# Patient Record
Sex: FEMALE | Race: WHITE | NOT HISPANIC OR LATINO | Employment: FULL TIME | URBAN - METROPOLITAN AREA
[De-identification: names, ages, dates, MRNs, and addresses within clinical notes are randomized per-mention and may not be internally consistent; named-entity substitution may affect disease eponyms.]

---

## 2020-02-10 ENCOUNTER — TELEPHONE (OUTPATIENT)
Dept: OTOLARYNGOLOGY | Facility: CLINIC | Age: 55
End: 2020-02-10

## 2020-02-10 ENCOUNTER — OFFICE VISIT (OUTPATIENT)
Dept: OTOLARYNGOLOGY | Facility: CLINIC | Age: 55
End: 2020-02-10
Payer: COMMERCIAL

## 2020-02-10 VITALS
BODY MASS INDEX: 19.58 KG/M2 | DIASTOLIC BLOOD PRESSURE: 68 MMHG | HEIGHT: 65 IN | SYSTOLIC BLOOD PRESSURE: 111 MMHG | HEART RATE: 67 BPM | WEIGHT: 117.5 LBS | TEMPERATURE: 98 F

## 2020-02-10 DIAGNOSIS — R51.9 NONINTRACTABLE EPISODIC HEADACHE, UNSPECIFIED HEADACHE TYPE: ICD-10-CM

## 2020-02-10 DIAGNOSIS — J01.90 ACUTE NON-RECURRENT SINUSITIS, UNSPECIFIED LOCATION: ICD-10-CM

## 2020-02-10 DIAGNOSIS — J30.9 ALLERGIC RHINITIS, UNSPECIFIED SEASONALITY, UNSPECIFIED TRIGGER: ICD-10-CM

## 2020-02-10 DIAGNOSIS — J34.2 NASAL SEPTAL DEVIATION: ICD-10-CM

## 2020-02-10 DIAGNOSIS — J10.1 INFLUENZA A: Primary | ICD-10-CM

## 2020-02-10 DIAGNOSIS — R05.9 COUGH: ICD-10-CM

## 2020-02-10 DIAGNOSIS — H61.20 IMPACTED CERUMEN, UNSPECIFIED LATERALITY: ICD-10-CM

## 2020-02-10 PROCEDURE — 69210 REMOVE IMPACTED EAR WAX UNI: CPT | Mod: S$GLB,,, | Performed by: SPECIALIST

## 2020-02-10 PROCEDURE — 3008F PR BODY MASS INDEX (BMI) DOCUMENTED: ICD-10-PCS | Mod: CPTII,S$GLB,, | Performed by: SPECIALIST

## 2020-02-10 PROCEDURE — 99214 PR OFFICE/OUTPT VISIT, EST, LEVL IV, 30-39 MIN: ICD-10-PCS | Mod: 25,S$GLB,, | Performed by: SPECIALIST

## 2020-02-10 PROCEDURE — 96372 THER/PROPH/DIAG INJ SC/IM: CPT | Mod: 59,S$GLB,, | Performed by: SPECIALIST

## 2020-02-10 PROCEDURE — 96372 PR INJECTION,THERAP/PROPH/DIAG2ST, IM OR SUBCUT: ICD-10-PCS | Mod: 59,S$GLB,, | Performed by: SPECIALIST

## 2020-02-10 PROCEDURE — 3008F BODY MASS INDEX DOCD: CPT | Mod: CPTII,S$GLB,, | Performed by: SPECIALIST

## 2020-02-10 PROCEDURE — 99214 OFFICE O/P EST MOD 30 MIN: CPT | Mod: 25,S$GLB,, | Performed by: SPECIALIST

## 2020-02-10 PROCEDURE — 69210 EAR CERUMEN REMOVAL: ICD-10-PCS | Mod: S$GLB,,, | Performed by: SPECIALIST

## 2020-02-10 RX ORDER — BETAMETHASONE SODIUM PHOSPHATE AND BETAMETHASONE ACETATE 3; 3 MG/ML; MG/ML
6 INJECTION, SUSPENSION INTRA-ARTICULAR; INTRALESIONAL; INTRAMUSCULAR; SOFT TISSUE ONCE
Status: DISCONTINUED | OUTPATIENT
Start: 2020-02-10 | End: 2020-02-12 | Stop reason: SDUPTHER

## 2020-02-10 RX ORDER — CEFTRIAXONE 1 G/1
1 INJECTION, POWDER, FOR SOLUTION INTRAMUSCULAR; INTRAVENOUS
Status: DISCONTINUED | OUTPATIENT
Start: 2020-02-10 | End: 2020-02-12 | Stop reason: SDUPTHER

## 2020-02-10 RX ORDER — LEVOTHYROXINE SODIUM 125 UG/1
TABLET ORAL
COMMUNITY
Start: 2020-01-17

## 2020-02-10 RX ORDER — LIDOCAINE HYDROCHLORIDE 10 MG/ML
2 INJECTION INFILTRATION; PERINEURAL ONCE
Status: COMPLETED | OUTPATIENT
Start: 2020-02-10 | End: 2020-02-10

## 2020-02-10 RX ORDER — LIDOCAINE HYDROCHLORIDE 10 MG/ML
2 INJECTION INFILTRATION; PERINEURAL ONCE
Status: SHIPPED | OUTPATIENT
Start: 2020-02-10

## 2020-02-10 RX ORDER — CEFTRIAXONE 1 G/1
1 INJECTION, POWDER, FOR SOLUTION INTRAMUSCULAR; INTRAVENOUS
Status: COMPLETED | OUTPATIENT
Start: 2020-02-10 | End: 2020-02-10

## 2020-02-10 RX ORDER — BETAMETHASONE SODIUM PHOSPHATE AND BETAMETHASONE ACETATE 3; 3 MG/ML; MG/ML
6 INJECTION, SUSPENSION INTRA-ARTICULAR; INTRALESIONAL; INTRAMUSCULAR; SOFT TISSUE ONCE
Status: COMPLETED | OUTPATIENT
Start: 2020-02-10 | End: 2020-02-10

## 2020-02-10 RX ORDER — OSELTAMIVIR PHOSPHATE 75 MG/1
75 CAPSULE ORAL 2 TIMES DAILY
Qty: 10 CAPSULE | Refills: 0 | Status: SHIPPED | OUTPATIENT
Start: 2020-02-10 | End: 2020-02-15

## 2020-02-10 RX ORDER — AZITHROMYCIN 500 MG/1
500 TABLET, FILM COATED ORAL DAILY
Qty: 3 TABLET | Refills: 1 | Status: SHIPPED | OUTPATIENT
Start: 2020-02-10 | End: 2020-02-13

## 2020-02-10 RX ADMIN — LIDOCAINE HYDROCHLORIDE 2 ML: 10 INJECTION INFILTRATION; PERINEURAL at 02:02

## 2020-02-10 RX ADMIN — CEFTRIAXONE 1 G: 1 INJECTION, POWDER, FOR SOLUTION INTRAMUSCULAR; INTRAVENOUS at 02:02

## 2020-02-10 RX ADMIN — BETAMETHASONE SODIUM PHOSPHATE AND BETAMETHASONE ACETATE 6 MG: 3; 3 INJECTION, SUSPENSION INTRA-ARTICULAR; INTRALESIONAL; INTRAMUSCULAR; SOFT TISSUE at 02:02

## 2020-02-10 NOTE — TELEPHONE ENCOUNTER
----- Message from Kyra Azevedo sent at 2/10/2020  8:27 AM CST -----  Contact: JANAE CM [[id number]]  Name of Who is Calling : JANAE CM [[id number]]    Patient is requesting a call from staff in regards to getting an appointment for today due to upper respiratory infection  .....Please contact to further discuss and advise.    Can the clinic reply by [facility] : No    What Number to Call Back :  [phone]

## 2020-02-10 NOTE — PROCEDURES
"Shannan Puentes is a 54 y.o. female patient.    Temp: 97.7 °F (36.5 °C) (02/10/20 1309)  Pulse: 67 (02/10/20 1309)  BP: 111/68 (02/10/20 1309)  Weight: 53.3 kg (117 lb 8 oz) (02/10/20 1309)  Height: 5' 5" (165.1 cm) (02/10/20 1309)       Ear Cerumen Removal  Date/Time: 2/10/2020 1:00 PM  Performed by: ARSEN Morgan MD  Authorized by: ARSEN Morgan MD     Time out: Immediately prior to procedure a "time out" was called to verify the correct patient, procedure, equipment, support staff and site/side marked as required.    Consent Done?:  Yes (Verbal)    Local anesthetic:  None  Location details:  Both ears  Procedure type comment:  [place] suction and irrigation  Cerumen  Removal Results:  Cerumen completely removed        LEWIS Morgan  2/10/2020    "

## 2020-02-10 NOTE — TELEPHONE ENCOUNTER
----- Message from Shannan Mei sent at 2/10/2020 10:32 AM CST -----  Contact: SHANNAN CM [[id number]]  Type:  Patient Returning Call    Who Called: SHANNAN CM [[id number]]    Who Left Message for Patient: Lakhwinder VAZQUEZ    Does the patient know what this is regarding?: Appointment    Can the clinic reply in [facility]: No    Best Call Back Number: [phone]    Additional Information: N/A

## 2020-02-10 NOTE — TELEPHONE ENCOUNTER
----- Message from Kyra Azevedo sent at 2/10/2020 11:04 AM CST -----  Contact: JANAE CM [[id number]]  Name of Who is Calling : JANAE CM [[id number]]    Patient is returning a call from [facility] in regards to being seen today   .....Please contact to further discuss and advise.    Can the clinic reply by [facility] : No    What Number to Call Back :  [phone]

## 2020-02-10 NOTE — PROGRESS NOTES
Subjective:       Patient ID: Shannan Puentes is a 54 y.o. female.    Chief Complaint: Cough (with chest pain) and Sinus Problem (with congestion)    The patient is an established patient from a former practice.  Three days ago she developed sudden onset of headache, congestion, runny nose, sore throat and coughing.  She has had a low-grade fever.  She also has some muscle and joint aches.  Her  was seen in an urgent care for a similar illness 2 days prior to her onset of symptoms.  By flu test and strep test done at the urgent care on him were negative.    Review of Systems   Constitutional: Positive for chills, fatigue and fever. Negative for activity change and appetite change.   HENT: Positive for congestion, ear pain, postnasal drip, rhinorrhea, sinus pressure, sinus pain and sore throat. Negative for ear discharge, facial swelling, hearing loss, mouth sores, sneezing, tinnitus, trouble swallowing and voice change.    Eyes: Negative for photophobia, pain, discharge, redness, itching and visual disturbance.   Respiratory: Positive for cough. Negative for apnea, choking, shortness of breath and wheezing.    Cardiovascular: Negative for chest pain and palpitations.   Gastrointestinal: Negative for abdominal distention, abdominal pain, nausea and vomiting.   Musculoskeletal: Negative for arthralgias, myalgias, neck pain and neck stiffness.   Skin: Negative.  Negative for color change, pallor and rash.   Allergic/Immunologic: Positive for environmental allergies. Negative for food allergies and immunocompromised state.   Neurological: Positive for dizziness, weakness, light-headedness and headaches. Negative for speech difficulty.   Hematological: Negative for adenopathy. Does not bruise/bleed easily.   Psychiatric/Behavioral: Negative for agitation, confusion, decreased concentration and sleep disturbance.       Objective:      Physical Exam   Constitutional: She is oriented to person, place, and time.  She appears well-developed and well-nourished.   HENT:   Head: Normocephalic.   Right Ear: External ear and ear canal normal. Tympanic membrane is retracted. Tympanic membrane mobility is abnormal.   Left Ear: External ear and ear canal normal. Tympanic membrane is retracted. Tympanic membrane mobility is abnormal.   Nose: Mucosal edema (with inflamed turbinates bilaterall), rhinorrhea (Light yellow pus bilaterally ) and septal deviation (To the right) present. No nasal deformity.   Mouth/Throat: Uvula is midline and mucous membranes are normal. No oral lesions. Posterior oropharyngeal erythema ( mild) present. No oropharyngeal exudate (erythema thin yellow pus from the nasopharynx). No tonsillar exudate.   Eyes: Pupils are equal, round, and reactive to light. EOM and lids are normal. Right eye exhibits no discharge. Left eye exhibits no discharge. Right conjunctiva is injected. Left conjunctiva is injected.   Neck: Trachea normal, normal range of motion, full passive range of motion without pain and phonation normal. Neck supple. No neck rigidity. No thyroid mass and no thyromegaly present.   Cardiovascular: Normal rate, regular rhythm and normal heart sounds.   Pulmonary/Chest: No respiratory distress. She has decreased breath sounds ( Diffusely). She has no wheezes. She has no rhonchi. She has no rales.   Abdominal: Soft. Bowel sounds are normal. There is no tenderness.   Musculoskeletal: Normal range of motion.        Right shoulder: Normal.   Lymphadenopathy:        Head (right side): No occipital adenopathy present.        Head (left side): No occipital adenopathy present.     She has no cervical adenopathy.   Neurological: She is alert and oriented to person, place, and time. She has normal strength. No cranial nerve deficit or sensory deficit. Gait normal.   Skin: Skin is warm and dry. No lesion, no petechiae and no rash noted. No cyanosis. Nails show no clubbing.   Psychiatric: She has a normal mood and  affect. Her speech is normal and behavior is normal. Cognition and memory are normal.       Influenza A screen:  Positive    Influenza B screen:  Negative     Assessment:       1. Influenza A    2. Nonintractable episodic headache, unspecified headache type    3. Cough    4. Nasal septal deviation    5. Impacted cerumen, unspecified laterality    6. Acute non-recurrent sinusitis, unspecified location    7. Allergic rhinitis, unspecified seasonality, unspecified trigger        Plan:       I will place the patient on Tamiflu am giving her Zithromax to start in the event she develops purulent secretions from the nose or in the chest.  I will recheck her in 1 week on an as-needed basis.  If her condition is worsening she will call.  If she develops significant respiratory distress she needs to go to an emergency room.

## 2020-09-29 ENCOUNTER — OFFICE VISIT (OUTPATIENT)
Dept: OTOLARYNGOLOGY | Facility: CLINIC | Age: 55
End: 2020-09-29
Payer: COMMERCIAL

## 2020-09-29 VITALS
DIASTOLIC BLOOD PRESSURE: 73 MMHG | SYSTOLIC BLOOD PRESSURE: 119 MMHG | HEART RATE: 71 BPM | WEIGHT: 119.19 LBS | BODY MASS INDEX: 20.35 KG/M2 | TEMPERATURE: 98 F | HEIGHT: 64 IN

## 2020-09-29 DIAGNOSIS — H61.22 IMPACTED CERUMEN OF LEFT EAR: ICD-10-CM

## 2020-09-29 DIAGNOSIS — H93.291 ABNORMAL AUDITORY PERCEPTION OF RIGHT EAR: Primary | ICD-10-CM

## 2020-09-29 DIAGNOSIS — J34.2 NASAL SEPTAL DEVIATION: ICD-10-CM

## 2020-09-29 DIAGNOSIS — J30.9 ALLERGIC RHINITIS, UNSPECIFIED SEASONALITY, UNSPECIFIED TRIGGER: ICD-10-CM

## 2020-09-29 PROCEDURE — 69210 PR REMOVAL IMPACTED CERUMEN REQUIRING INSTRUMENTATION, UNILATERAL: ICD-10-PCS | Mod: S$GLB,,, | Performed by: SPECIALIST

## 2020-09-29 PROCEDURE — 69210 REMOVE IMPACTED EAR WAX UNI: CPT | Mod: S$GLB,,, | Performed by: SPECIALIST

## 2020-09-29 PROCEDURE — 3008F PR BODY MASS INDEX (BMI) DOCUMENTED: ICD-10-PCS | Mod: CPTII,S$GLB,, | Performed by: SPECIALIST

## 2020-09-29 PROCEDURE — 99213 OFFICE O/P EST LOW 20 MIN: CPT | Mod: 25,S$GLB,, | Performed by: SPECIALIST

## 2020-09-29 PROCEDURE — 99213 PR OFFICE/OUTPT VISIT, EST, LEVL III, 20-29 MIN: ICD-10-PCS | Mod: 25,S$GLB,, | Performed by: SPECIALIST

## 2020-09-29 PROCEDURE — 3008F BODY MASS INDEX DOCD: CPT | Mod: CPTII,S$GLB,, | Performed by: SPECIALIST

## 2020-09-29 RX ORDER — AZELASTINE 1 MG/ML
SPRAY, METERED NASAL
Qty: 30 ML | Refills: 11 | Status: SHIPPED | OUTPATIENT
Start: 2020-09-29

## 2020-09-29 RX ORDER — FLUTICASONE PROPIONATE 50 MCG
SPRAY, SUSPENSION (ML) NASAL
Qty: 18.2 ML | Refills: 11 | Status: SHIPPED | OUTPATIENT
Start: 2020-09-29

## 2020-09-29 NOTE — PROGRESS NOTES
Subjective:       Patient ID: Shannan Puentes is a 54 y.o. female.    Chief Complaint: Follow-up (with Right Ear fullness)    Follow-up    The patient is experiencing S fullness and decreased hearing in her right ear for the last 2 weeks.  She feels like the ear needs to pop but it will not.  She has no vertigo and no tinnitus.  She is having some congestion and postnasal drip.  She typically uses Aleve for sinus and Sudafed.  She feels that the hearing in her right ear is slightly diminished.  Nasal secretions are clear.  She does not have fever.      Review of Systems   Constitutional: Negative for activity change and appetite change.   HENT: Positive for ear pain, postnasal drip, rhinorrhea, sinus pressure and sinus pain. Negative for ear discharge, facial swelling, hearing loss, mouth sores, sneezing, tinnitus, trouble swallowing and voice change.    Eyes: Negative for photophobia, pain, discharge, redness, itching and visual disturbance.   Respiratory: Negative for apnea, choking, shortness of breath and wheezing.    Cardiovascular: Negative for palpitations.   Gastrointestinal: Negative for abdominal distention.   Musculoskeletal: Negative for neck stiffness.   Skin: Negative.  Negative for color change and pallor.   Allergic/Immunologic: Positive for environmental allergies. Negative for food allergies and immunocompromised state.   Neurological: Positive for dizziness and light-headedness. Negative for speech difficulty.   Hematological: Negative for adenopathy. Does not bruise/bleed easily.   Psychiatric/Behavioral: Negative for agitation, confusion, decreased concentration and sleep disturbance.       Objective:      Physical Exam  Constitutional:       Appearance: She is well-developed.   HENT:      Head: Normocephalic.      Jaw: There is normal jaw occlusion.      Salivary Glands: Right salivary gland is not diffusely enlarged. Left salivary gland is not diffusely enlarged.      Right Ear: Ear canal  and external ear normal. Decreased hearing noted. There is no impacted cerumen. Tympanic membrane is retracted. Tympanic membrane has decreased mobility.      Left Ear: Hearing, ear canal and external ear normal. There is impacted cerumen. Tympanic membrane is retracted. Tympanic membrane has decreased mobility.      Ears:      Santoro exam findings: lateralizes right.     Right Rinne: AC > BC.     Left Rinne: AC > BC.     Nose: Septal deviation (To the right), mucosal edema (with inflamed turbinates bilaterall) and rhinorrhea (Light yellow pus bilaterally ) present. No nasal deformity.      Right Turbinates: Enlarged and pale.      Left Turbinates: Enlarged and pale.      Mouth/Throat:      Lips: No lesions.      Mouth: No oral lesions.      Tongue: No lesions.      Palate: No mass and lesions.      Pharynx: Uvula midline. Oropharyngeal exudate: erythema thin yellow pus from the nasopharynx  Posterior oropharyngeal erythema:  mild       Tonsils: No tonsillar exudate.   Eyes:      General: Lids are normal.         Right eye: No discharge.         Left eye: No discharge.      Conjunctiva/sclera:      Right eye: Right conjunctiva is injected.      Left eye: Left conjunctiva is injected.      Pupils: Pupils are equal, round, and reactive to light.   Neck:      Musculoskeletal: Full passive range of motion without pain, normal range of motion and neck supple. No neck rigidity.      Thyroid: No thyroid mass or thyromegaly.      Trachea: Trachea and phonation normal.   Cardiovascular:      Rate and Rhythm: Normal rate and regular rhythm.      Heart sounds: Normal heart sounds.   Pulmonary:      Effort: No respiratory distress.      Breath sounds: No decreased breath sounds ( Diffusely), wheezing, rhonchi or rales.   Abdominal:      General: Bowel sounds are normal.      Palpations: Abdomen is soft.      Tenderness: There is no abdominal tenderness.   Musculoskeletal: Normal range of motion.      Right shoulder: Normal.    Lymphadenopathy:      Head:      Right side of head: No occipital adenopathy.      Left side of head: No occipital adenopathy.      Cervical: No cervical adenopathy.   Skin:     General: Skin is warm and dry.      Findings: No lesion, petechiae or rash.      Nails: There is no clubbing.     Neurological:      Mental Status: She is alert and oriented to person, place, and time.      Cranial Nerves: No cranial nerve deficit.      Sensory: No sensory deficit.      Gait: Gait normal.   Psychiatric:         Speech: Speech normal.         Behavior: Behavior normal.         Procedure-cerumen impaction was removed from the left ear under direct visualization using wire loop.  The patient tolerated procedure well was discharged post procedure.     Assessment:       1. Abnormal auditory perception of right ear    2. Impacted cerumen of left ear    3. Allergic rhinitis, unspecified seasonality, unspecified trigger    4. Nasal septal deviation        Plan:       I will place the patient on the combination of azelastine and Flonase to be used twice daily.  I will recheck her in 1 week.  She needs to undergo complete audiologic evaluation prior to seeing me at that visit.

## 2020-09-29 NOTE — PROCEDURES
"Ear Cerumen Removal    Date/Time: 9/29/2020 2:45 PM  Performed by: ARSEN Morgan MD  Authorized by: ARSEN Morgan MD     Time out: Immediately prior to procedure a "time out" was called to verify the correct patient, procedure, equipment, support staff and site/side marked as required.    Consent Done?:  Yes (Verbal)    Local anesthetic:  None  Location details:  Left ear  Procedure type comment:  Wire loop  Cerumen  Removal Results:  Cerumen completely removed  Patient tolerance:  Patient tolerated the procedure well with no immediate complications      "

## 2020-10-06 ENCOUNTER — CLINICAL SUPPORT (OUTPATIENT)
Dept: OTOLARYNGOLOGY | Facility: CLINIC | Age: 55
End: 2020-10-06
Payer: COMMERCIAL

## 2020-10-06 ENCOUNTER — OFFICE VISIT (OUTPATIENT)
Dept: OTOLARYNGOLOGY | Facility: CLINIC | Age: 55
End: 2020-10-06
Payer: COMMERCIAL

## 2020-10-06 VITALS
DIASTOLIC BLOOD PRESSURE: 76 MMHG | WEIGHT: 117.81 LBS | TEMPERATURE: 97 F | HEART RATE: 76 BPM | SYSTOLIC BLOOD PRESSURE: 116 MMHG | HEIGHT: 64 IN | BODY MASS INDEX: 20.11 KG/M2

## 2020-10-06 DIAGNOSIS — H93.293 ABNORMAL AUDITORY PERCEPTION OF BOTH EARS: ICD-10-CM

## 2020-10-06 DIAGNOSIS — Z01.10 ENCOUNTER FOR HEARING EXAMINATION WITHOUT ABNORMAL FINDINGS: Primary | ICD-10-CM

## 2020-10-06 DIAGNOSIS — Z82.2 FAMILY HISTORY OF HEARING LOSS: ICD-10-CM

## 2020-10-06 DIAGNOSIS — R29.2 ABNORMAL ACOUSTIC REFLEX: ICD-10-CM

## 2020-10-06 DIAGNOSIS — J34.2 NASAL SEPTAL DEVIATION: ICD-10-CM

## 2020-10-06 DIAGNOSIS — H93.8X1 SENSATION OF FULLNESS IN RIGHT EAR: ICD-10-CM

## 2020-10-06 DIAGNOSIS — H69.93 DYSFUNCTION OF BOTH EUSTACHIAN TUBES: ICD-10-CM

## 2020-10-06 DIAGNOSIS — J30.9 ALLERGIC RHINITIS, UNSPECIFIED SEASONALITY, UNSPECIFIED TRIGGER: Primary | ICD-10-CM

## 2020-10-06 PROCEDURE — 92557 COMPREHENSIVE HEARING TEST: CPT | Mod: S$GLB,,, | Performed by: AUDIOLOGIST-HEARING AID FITTER

## 2020-10-06 PROCEDURE — 3008F PR BODY MASS INDEX (BMI) DOCUMENTED: ICD-10-PCS | Mod: CPTII,S$GLB,, | Performed by: SPECIALIST

## 2020-10-06 PROCEDURE — 92550 PR TYMPANOMETRY AND REFLEX THRESHOLD MEASUREMENTS: ICD-10-PCS | Mod: S$GLB,,, | Performed by: AUDIOLOGIST-HEARING AID FITTER

## 2020-10-06 PROCEDURE — 92557 PR COMPREHENSIVE HEARING TEST: ICD-10-PCS | Mod: S$GLB,,, | Performed by: AUDIOLOGIST-HEARING AID FITTER

## 2020-10-06 PROCEDURE — 99214 OFFICE O/P EST MOD 30 MIN: CPT | Mod: S$GLB,,, | Performed by: SPECIALIST

## 2020-10-06 PROCEDURE — 3008F BODY MASS INDEX DOCD: CPT | Mod: CPTII,S$GLB,, | Performed by: SPECIALIST

## 2020-10-06 PROCEDURE — 99214 PR OFFICE/OUTPT VISIT, EST, LEVL IV, 30-39 MIN: ICD-10-PCS | Mod: S$GLB,,, | Performed by: SPECIALIST

## 2020-10-06 PROCEDURE — 92550 TYMPANOMETRY & REFLEX THRESH: CPT | Mod: S$GLB,,, | Performed by: AUDIOLOGIST-HEARING AID FITTER

## 2020-10-06 RX ORDER — PREDNISONE 5 MG/1
TABLET ORAL
Qty: 21 TABLET | Refills: 0 | Status: SHIPPED | OUTPATIENT
Start: 2020-10-06 | End: 2020-11-03 | Stop reason: ALTCHOICE

## 2020-10-06 NOTE — PROGRESS NOTES
Jenny Guadarrama, CCC-A  Audiologist - [facility] [address] [place] [address]  [email or web address]  [phone]    Patient: Shannan Puentes   MRN: [id number]  [address] [facility]  Home Phone [phone]   Work Phone Not on file.   Mobile [phone]   : 1965  LOPEZ: 10/6/2020      AUDIOLOGICAL EVALUATION      IMPRESSION:   Audiological testing indicated that Shannan Puentes has normal hearing in both ears.    RECOMMENDATIONS:   It is recommended that she:   Follow up medically with Dr. Morgan.   Continue to receive audiological monitoring annually.   Use precaution and/or hearing protection in noisy environments.    If you should have any questions or concerns regarding the above information, please do not hesitate to contact me at [phone].      _______________________________  Jenny Guadarrama, CCC-A, F-AAA  Audiologist

## 2020-11-03 ENCOUNTER — OFFICE VISIT (OUTPATIENT)
Dept: OTOLARYNGOLOGY | Facility: CLINIC | Age: 55
End: 2020-11-03
Payer: COMMERCIAL

## 2020-11-03 VITALS
TEMPERATURE: 98 F | BODY MASS INDEX: 20.32 KG/M2 | WEIGHT: 118.38 LBS | SYSTOLIC BLOOD PRESSURE: 122 MMHG | DIASTOLIC BLOOD PRESSURE: 76 MMHG

## 2020-11-03 DIAGNOSIS — J34.2 NASAL SEPTAL DEVIATION: ICD-10-CM

## 2020-11-03 DIAGNOSIS — H69.93 DYSFUNCTION OF BOTH EUSTACHIAN TUBES: ICD-10-CM

## 2020-11-03 DIAGNOSIS — J30.9 ALLERGIC RHINITIS, UNSPECIFIED SEASONALITY, UNSPECIFIED TRIGGER: Primary | ICD-10-CM

## 2020-11-03 PROCEDURE — 99213 OFFICE O/P EST LOW 20 MIN: CPT | Mod: S$GLB,,, | Performed by: SPECIALIST

## 2020-11-03 PROCEDURE — 3008F BODY MASS INDEX DOCD: CPT | Mod: CPTII,S$GLB,, | Performed by: SPECIALIST

## 2020-11-03 PROCEDURE — 3008F PR BODY MASS INDEX (BMI) DOCUMENTED: ICD-10-PCS | Mod: CPTII,S$GLB,, | Performed by: SPECIALIST

## 2020-11-03 PROCEDURE — 99213 PR OFFICE/OUTPT VISIT, EST, LEVL III, 20-29 MIN: ICD-10-PCS | Mod: S$GLB,,, | Performed by: SPECIALIST

## 2020-11-03 NOTE — PROGRESS NOTES
Subjective:       Patient ID: Shannan Puentes is a 55 y.o. female.    Chief Complaint: Follow-up    Follow-up    The patient has just finished with a steroid taper and has been using azelastine, fluticasone and low-dose Sudafed.  The ear pressure and popping have completely subsided.  She is not having any airway issues.  Nasal secretions are clear.  She does not have fever.      Review of Systems   Constitutional: Negative for activity change and appetite change.   HENT: Positive for ear pain, postnasal drip, rhinorrhea, sinus pressure and sinus pain. Negative for ear discharge, facial swelling, hearing loss, mouth sores, sneezing, tinnitus, trouble swallowing and voice change.    Eyes: Negative for photophobia, pain, discharge, redness, itching and visual disturbance.   Respiratory: Negative for apnea, choking, shortness of breath and wheezing.    Cardiovascular: Negative for palpitations.   Gastrointestinal: Negative for abdominal distention.   Musculoskeletal: Negative for neck stiffness.   Skin: Negative.  Negative for color change and pallor.   Allergic/Immunologic: Positive for environmental allergies. Negative for food allergies and immunocompromised state.   Neurological: Positive for dizziness and light-headedness. Negative for speech difficulty.   Hematological: Negative for adenopathy. Does not bruise/bleed easily.   Psychiatric/Behavioral: Negative for agitation, confusion, decreased concentration and sleep disturbance.       Objective:      Physical Exam  Constitutional:       Appearance: She is well-developed.   HENT:      Head: Normocephalic.      Jaw: There is normal jaw occlusion.      Salivary Glands: Right salivary gland is not diffusely enlarged. Left salivary gland is not diffusely enlarged.      Right Ear: Ear canal and external ear normal. Decreased hearing noted. There is no impacted cerumen. Tympanic membrane is retracted. Tympanic membrane has decreased mobility.      Left Ear:  Hearing, ear canal and external ear normal. There is impacted cerumen. Tympanic membrane is retracted. Tympanic membrane has decreased mobility.      Ears:      Santoro exam findings: lateralizes right.     Right Rinne: AC > BC.     Left Rinne: AC > BC.     Nose: Septal deviation (To the right), mucosal edema (with inflamed turbinates bilaterall) and rhinorrhea (Light yellow pus bilaterally ) present. No nasal deformity.      Right Turbinates: Enlarged and pale.      Left Turbinates: Enlarged and pale.      Mouth/Throat:      Lips: No lesions.      Mouth: No oral lesions.      Tongue: No lesions.      Palate: No mass and lesions.      Pharynx: Uvula midline. Oropharyngeal exudate: erythema thin yellow pus from the nasopharynx  Posterior oropharyngeal erythema:  mild       Tonsils: No tonsillar exudate.   Eyes:      General: Lids are normal.         Right eye: No discharge.         Left eye: No discharge.      Conjunctiva/sclera:      Right eye: Right conjunctiva is injected.      Left eye: Left conjunctiva is injected.      Pupils: Pupils are equal, round, and reactive to light.   Neck:      Musculoskeletal: Full passive range of motion without pain, normal range of motion and neck supple. No neck rigidity.      Thyroid: No thyroid mass or thyromegaly.      Trachea: Trachea and phonation normal.   Cardiovascular:      Rate and Rhythm: Normal rate and regular rhythm.      Heart sounds: Normal heart sounds.   Pulmonary:      Effort: No respiratory distress.      Breath sounds: No decreased breath sounds ( Diffusely), wheezing, rhonchi or rales.   Abdominal:      General: Bowel sounds are normal.      Palpations: Abdomen is soft.      Tenderness: There is no abdominal tenderness.   Musculoskeletal: Normal range of motion.      Right shoulder: Normal.   Lymphadenopathy:      Head:      Right side of head: No occipital adenopathy.      Left side of head: No occipital adenopathy.      Cervical: No cervical adenopathy.    Skin:     General: Skin is warm and dry.      Findings: No lesion, petechiae or rash.      Nails: There is no clubbing.     Neurological:      Mental Status: She is alert and oriented to person, place, and time.      Cranial Nerves: No cranial nerve deficit.      Sensory: No sensory deficit.      Gait: Gait normal.   Psychiatric:         Speech: Speech normal.         Behavior: Behavior normal.         Assessment:       1. Allergic rhinitis, unspecified seasonality, unspecified trigger    2. Dysfunction of both eustachian tubes    3. Nasal septal deviation        Plan:       I will have the patient continue with the combination of azelastine and Flonase to be used twice daily.  She can supplement with extended release, high-dose Sudafed as needed.  If that regimen is not adequate in controlling her symptoms she will schedule an appointment with me.

## 2021-01-25 NOTE — PROGRESS NOTES
Subjective:       Patient ID: Shannan Puentes is a 54 y.o. female.    Chief Complaint: Follow-up (with hearing test)    Follow-up    The patient has been you using azelastine, fluticasone and low-dose Sudafed.  She still has some pressure in her ear and needs to pop it intermittently.  Overall, she has improved significantly, but symptoms have not resolved.  Nasal secretions are clear.  She does not have fever.      Review of Systems   Constitutional: Negative for activity change and appetite change.   HENT: Positive for ear pain, postnasal drip, rhinorrhea, sinus pressure and sinus pain. Negative for ear discharge, facial swelling, hearing loss, mouth sores, sneezing, tinnitus, trouble swallowing and voice change.    Eyes: Negative for photophobia, pain, discharge, redness, itching and visual disturbance.   Respiratory: Negative for apnea, choking, shortness of breath and wheezing.    Cardiovascular: Negative for palpitations.   Gastrointestinal: Negative for abdominal distention.   Musculoskeletal: Negative for neck stiffness.   Skin: Negative.  Negative for color change and pallor.   Allergic/Immunologic: Positive for environmental allergies. Negative for food allergies and immunocompromised state.   Neurological: Positive for dizziness and light-headedness. Negative for speech difficulty.   Hematological: Negative for adenopathy. Does not bruise/bleed easily.   Psychiatric/Behavioral: Negative for agitation, confusion, decreased concentration and sleep disturbance.       Objective:      Physical Exam  Constitutional:       Appearance: She is well-developed.   HENT:      Head: Normocephalic.      Jaw: There is normal jaw occlusion.      Salivary Glands: Right salivary gland is not diffusely enlarged. Left salivary gland is not diffusely enlarged.      Right Ear: Ear canal and external ear normal. Decreased hearing noted. There is no impacted cerumen. Tympanic membrane is retracted. Tympanic membrane has  decreased mobility.      Left Ear: Hearing, ear canal and external ear normal. There is impacted cerumen. Tympanic membrane is retracted. Tympanic membrane has decreased mobility.      Ears:      Santoro exam findings: lateralizes right.     Right Rinne: AC > BC.     Left Rinne: AC > BC.     Nose: Septal deviation (To the right), mucosal edema (with inflamed turbinates bilaterall) and rhinorrhea (Light yellow pus bilaterally ) present. No nasal deformity.      Right Turbinates: Enlarged and pale.      Left Turbinates: Enlarged and pale.      Mouth/Throat:      Lips: No lesions.      Mouth: No oral lesions.      Tongue: No lesions.      Palate: No mass and lesions.      Pharynx: Uvula midline. Oropharyngeal exudate: erythema thin yellow pus from the nasopharynx  Posterior oropharyngeal erythema:  mild       Tonsils: No tonsillar exudate.   Eyes:      General: Lids are normal.         Right eye: No discharge.         Left eye: No discharge.      Conjunctiva/sclera:      Right eye: Right conjunctiva is injected.      Left eye: Left conjunctiva is injected.      Pupils: Pupils are equal, round, and reactive to light.   Neck:      Musculoskeletal: Full passive range of motion without pain, normal range of motion and neck supple. No neck rigidity.      Thyroid: No thyroid mass or thyromegaly.      Trachea: Trachea and phonation normal.   Cardiovascular:      Rate and Rhythm: Normal rate and regular rhythm.      Heart sounds: Normal heart sounds.   Pulmonary:      Effort: No respiratory distress.      Breath sounds: No decreased breath sounds ( Diffusely), wheezing, rhonchi or rales.   Abdominal:      General: Bowel sounds are normal.      Palpations: Abdomen is soft.      Tenderness: There is no abdominal tenderness.   Musculoskeletal: Normal range of motion.      Right shoulder: Normal.   Lymphadenopathy:      Head:      Right side of head: No occipital adenopathy.      Left side of head: No occipital adenopathy.       Cervical: No cervical adenopathy.   Skin:     General: Skin is warm and dry.      Findings: No lesion, petechiae or rash.      Nails: There is no clubbing.     Neurological:      Mental Status: She is alert and oriented to person, place, and time.      Cranial Nerves: No cranial nerve deficit.      Sensory: No sensory deficit.      Gait: Gait normal.   Psychiatric:         Speech: Speech normal.         Behavior: Behavior normal.           I personally reviewed the above audiogram and discussed in full detail with the patient at the end of her visit.  I provided her with a copy of the audiogram.     Assessment:       1. Allergic rhinitis, unspecified seasonality, unspecified trigger    2. Dysfunction of both eustachian tubes    3. Abnormal auditory perception of both ears    4. Nasal septal deviation        Plan:       I will place the patient on the combination of azelastine and Flonase to be used twice daily.  She can supplement with extended release, high-dose Sudafed as needed.  I am placing her on a steroid taper.  If her ears do not clear on want to have her return in 2 weeks.  If she is doing well she needs to continue with azelastine and fluticasone twice daily through the end of November at a minimum.  I will recheck her in 4 weeks.   85